# Patient Record
Sex: MALE | URBAN - METROPOLITAN AREA
[De-identification: names, ages, dates, MRNs, and addresses within clinical notes are randomized per-mention and may not be internally consistent; named-entity substitution may affect disease eponyms.]

---
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
## 2025-07-12 NOTE — PROCEDURE: MEDICATION COUNSELING
Home regimen: aspirin 81 qd, plavix 75 qd, rosuvastatin 20 qd  - defer antiplatelet therapy to cardiology, though stent>1 year ago  - continue rosuvastatin 20mg qHS Home regimen: aspirin 81 qd, Plavix 75 qd, rosuvastatin 20 qd  - defer antiplatelet therapy to cardiology, though stent>1 year ago  - continue rosuvastatin 20mg qHS Rituxan Pregnancy And Lactation Text: This medication is Pregnancy Category C and it isn't know if it is safe during pregnancy. It is unknown if this medication is excreted in breast milk but similar antibodies are known to be excreted.